# Patient Record
Sex: FEMALE | Race: WHITE
[De-identification: names, ages, dates, MRNs, and addresses within clinical notes are randomized per-mention and may not be internally consistent; named-entity substitution may affect disease eponyms.]

---

## 2018-01-02 ENCOUNTER — HOSPITAL ENCOUNTER (EMERGENCY)
Dept: HOSPITAL 91 - FTE | Age: 38
Discharge: HOME | End: 2018-01-02
Payer: COMMERCIAL

## 2018-01-02 ENCOUNTER — HOSPITAL ENCOUNTER (EMERGENCY)
Age: 38
Discharge: HOME | End: 2018-01-02

## 2018-01-02 DIAGNOSIS — J06.9: Primary | ICD-10-CM

## 2018-01-02 LAB
ADD UMIC: YES
UR ASCORBIC ACID: NEGATIVE MG/DL
UR BILIRUBIN (DIP): NEGATIVE MG/DL
UR BLOOD (DIP): (no result) MG/DL
UR CLARITY: (no result)
UR COLOR: YELLOW
UR GLUCOSE (DIP): NEGATIVE MG/DL
UR KETONES (DIP): NEGATIVE MG/DL
UR LEUKOCYTE ESTERASE (DIP): NEGATIVE LEU/UL
UR MUCUS: (no result) /HPF
UR NITRITE (DIP): NEGATIVE MG/DL
UR PH (DIP): 5 (ref 5–9)
UR RBC: 11 /HPF (ref 0–5)
UR SPECIFIC GRAVITY (DIP): 1.02 (ref 1–1.03)
UR SQUAMOUS EPITHELIAL CELL: (no result) /HPF
UR TOTAL PROTEIN (DIP): NEGATIVE MG/DL
UR UROBILINOGEN (DIP): NEGATIVE MG/DL
UR WBC: 2 /HPF (ref 0–5)

## 2018-01-02 PROCEDURE — 87400 INFLUENZA A/B EACH AG IA: CPT

## 2018-01-02 PROCEDURE — 96374 THER/PROPH/DIAG INJ IV PUSH: CPT

## 2018-01-02 PROCEDURE — 81001 URINALYSIS AUTO W/SCOPE: CPT

## 2018-01-02 PROCEDURE — 99284 EMERGENCY DEPT VISIT MOD MDM: CPT

## 2018-01-02 PROCEDURE — 71046 X-RAY EXAM CHEST 2 VIEWS: CPT

## 2018-01-02 RX ADMIN — KETOROLAC TROMETHAMINE 1 MG: 30 INJECTION, SOLUTION INTRAMUSCULAR at 10:04

## 2018-01-02 RX ADMIN — THIAMINE HYDROCHLORIDE 1 MLS/HR: 100 INJECTION, SOLUTION INTRAMUSCULAR; INTRAVENOUS at 10:04

## 2019-07-02 ENCOUNTER — HOSPITAL ENCOUNTER (EMERGENCY)
Dept: HOSPITAL 91 - FTE | Age: 39
Discharge: HOME | End: 2019-07-02
Payer: COMMERCIAL

## 2019-07-02 ENCOUNTER — HOSPITAL ENCOUNTER (EMERGENCY)
Dept: HOSPITAL 10 - FTE | Age: 39
Discharge: HOME | End: 2019-07-02
Payer: COMMERCIAL

## 2019-07-02 VITALS
HEIGHT: 66 IN | BODY MASS INDEX: 27.18 KG/M2 | WEIGHT: 169.09 LBS | WEIGHT: 169.09 LBS | HEIGHT: 66 IN | BODY MASS INDEX: 27.18 KG/M2

## 2019-07-02 VITALS — RESPIRATION RATE: 16 BRPM | DIASTOLIC BLOOD PRESSURE: 78 MMHG | HEART RATE: 106 BPM | SYSTOLIC BLOOD PRESSURE: 148 MMHG

## 2019-07-02 DIAGNOSIS — S50.861A: Primary | ICD-10-CM

## 2019-07-02 DIAGNOSIS — S90.862A: ICD-10-CM
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
## 2019-07-02 PROCEDURE — 99284 EMERGENCY DEPT VISIT MOD MDM: CPT

## 2019-07-02 PROCEDURE — 96372 THER/PROPH/DIAG INJ SC/IM: CPT

## 2019-07-02 RX ADMIN — DIPHENHYDRAMINE HYDROCHLORIDE 1 MG: 25 CAPSULE ORAL at 12:09

## 2019-07-02 RX ADMIN — IBUPROFEN 1 MG: 600 TABLET ORAL at 12:09

## 2019-07-02 RX ADMIN — DEXAMETHASONE SODIUM PHOSPHATE 1 MG: 10 INJECTION, SOLUTION INTRAMUSCULAR; INTRAVENOUS at 12:09

## 2019-07-02 NOTE — ERD
ER Documentation


Chief Complaint


Chief Complaint





Pt with possible spider bite to hand/arm . afebrile





HPI


39-year-old female presents the ED complaining of possible spider bite to her 


right arm and left foot.  She reports that she thinks she was bit this morning 


while at work.  She reports slight pain on the right arm and left foot and 


reports numbness and tingling sensation.  She reports the pain is 6 out of 10 


intensity and it is constant and localized to the affected areas.  She denies a 


history of similar bug bites in the past.  She states that she does not know w


here the bug came from or to have a bogginess.  She denies any fevers, bleeding,


or other signs of systemic infection.





ROS


All systems reviewed and are negative except as per history of present illness.





Medications


Home Meds


Active Scripts


Ibuprofen* (Motrin*) 600 Mg Tab, 600 MG PO Q6H PRN for PAIN AND OR ELEVATED 


TEMP, #30 TAB


   Prov:JORGE MELVIN PA-C         7/2/19


Diphenhydramine Hcl* (Benadryl*) 25 Mg Cap, 25 MG PO Q6 PRN for ITCHING/RASH, 


#30 TAB


   Prov:JORGE MELVIN PA-C         7/2/19


Methylprednisolone* (Medrol* DOSE PACK) 4 Mg/Dose-Pack Tab.ds.pk, 4 MG PO .AS 


DIRECTED for 5 Days, PACKET


   Prov:JORGE MELVIN PA-C         7/2/19


Guaifenesin-D-Methorphan Hb* (Guaifenesin* DM Syrup) 120 Ml Syrup, 10 ML PO Q4H 


PRN for COUGH, #240 ML


   Prov:LUL AGUILERA PA-C         1/2/18


Benzocaine/Menthol* (Cepacol* Sore Throat Lozenges) 1 Each Lozenge, 1 EACH MM 


q2h PRN for SORE THROAT, #20 LOZENGE


   Prov:LUL AGUILERA PA-C         1/2/18


Ibuprofen* (Motrin*) 600 Mg Tab, 600 MG PO Q6, #30 TAB


   Prov:LUL AGUILERA PA-C         1/2/18


Acetaminophen* (Tylenol*) 325 Mg Tablet, 1 TAB PO Q4 PRN for PAIN AND OR 


ELEVATED TEMP, #20 TAB


   Prov:LUL AGUILERA PA-C         1/2/18





Allergies


Allergies:  


Coded Allergies:  


     nitrofurantoin (Verified  Allergy, Unknown, 1/2/18)





PMhx/Soc


History of Surgery:  No


Anesthesia Reaction:  No


Hx Alcohol Use:  No


Hx Substance Use:  No


Hx Tobacco Use:  No





FmHx


Family History:  No diabetes





Physical Exam


Vitals





Vital Signs


  Date      Temp  Pulse  Resp  B/P (MAP)   Pulse Ox  O2          O2 Flow    FiO2


Time                                                 Delivery    Rate


    7/2/19  98.8    106    16      148/78        98


     11:21                          (101)





Physical Exam


Const:   No acute distress


Head:   Atraumatic 


Eyes:    Normal Conjunctiva


ENT:    Normal External Ears, Nose and Mouth.


Neck:               Full range of motion. No meningismus.


Resp:   Clear to auscultation bilaterally


Cardio:   Regular rate and rhythm, no murmurs


Abd:    Soft, non tender, non distended. Normal bowel sounds


Skin:   1 inch skin lesion to the right forearm that is pink and slightly 


elevated.  Appears like a bug bite.  Similar skin lesion on top of the left foot


Back:   No midline or flank tenderness


Ext:    No cyanosis, or edema


Neur:   Awake and alert


Psych:    Normal Mood and Affect


Results 24 hrs





Current Medications


 Medications
   Dose
          Sig/Bronwyn
       Start Time
   Status  Last


 (Trade)       Ordered        Route
 PRN     Stop Time              Admin
Dose


                              Reason                                Admin


                8 mg           ONCE  ONCE
    7/2/19        DC            7/2/19


Dexamethasone                 IM
            12:00
 7/2/19                12:09




  (Decadron)                                12:01


                25 mg          ONCE  ONCE
    7/2/19        DC            7/2/19


Diphenhydrami                 PO
            12:00
 7/2/19                12:09



ne
 HCl
                                     12:01


(Benadryl)


 Ibuprofen
     600 mg         ONCE  ONCE
    7/2/19        DC            7/2/19


(Motrin)                      PO
            12:00
 7/2/19                12:09



                                             12:01








Procedures/MDM


ED COURSE:


The patient was stable throughout ED course. I kept the patient informed of labo


ratory and diagnostic imaging results throughout the ED course.  








PROCEDURES: none











MEDICATIONS GIVEN: 


Decadron, Benadryl, Motrin


Patient tolerated medication well with no adverse reactions. Patient reported 


improvement in pain. 











MEDICAL DECISION MAKING:


Patient is a 39-year-old female presenting with bug bite on her right forearm 


and left leg since this morning.  She denies any fevers or signs of systemic 


infections.  Physical exam it appears to be a bug bite and I visualize possible 


location where the bug bit the patient.  Patient was given a shot of Decadron in


the ED and prescribed Medrol Dosepak with ibuprofen and Benadryl to help with 


her symptoms at home.  She tolerated the medicines well in the ED.  H&P and 


other data no c/w emergent process. Low suspicion for anaphylaxis, scabies, 


SJS/TEN, syphilis, erythema multiforme, sepsis, cellulitis, necrotizing 


fasciitis, gangrene, meningococcemia, allergic contact dermatitis, urticaria, 


eczema, tinea infection, or other emergent conditions. 





Vital signs were reviewed. Patient is afebrile. Patient was not hypoxic. Patient


was hemodynamically stable. Patient was told to follow up with primary care for 


further care and management. 








PRESCRIPTION: Benadryl, ibuprofen, Medrol Dosepak





DISCHARGE:


At this time, patient is stable for discharge and outpatient management. I have 


instructed the patient to follow-up with his/her primary care physician in 1-2 


days. I have discussed with the patient the possibility of needing to see a 


specialist for further workup and imaging studies if symptoms persist. I have 


instructed the patient to promptly return to the ER for any new or worsening sym


ptoms including increased pain, fever, nausea, vomiting, weakness or LOC. The 


patient expressed understanding of and agreement with this plan. All questions 


were answered. Home care instructions were provided. 





Disclaimer: Inadvertent spelling and grammatical errors are likely due to 


EHR/dictation software use and do not reflect on the overall quality of patient 


care. Also, please note that the electronic time recorded on this note does not 


necessarily reflect the actual time of the patient encounter.





Departure


Diagnosis:  


   Primary Impression:  


   Bite wound


Condition:  Fair


Patient Instructions:  Animal Bite, General


Referrals:  


[facility]


YOU HAVE RECEIVED A MEDICAL SCREENING EXAM AND THE RESULTS INDICATE THAT YOU DO 


NOT HAVE A CONDITION THAT REQUIRES URGENT TREATMENT IN THE EMERGENCY DEPARTMENT.





FURTHER EVALUATION AND TREATMENT OF YOUR CONDITION CAN WAIT UNTIL YOU ARE SEEN 


IN YOUR DOCTORS OFFICE WITHIN THE NEXT 1-2 DAYS. IT IS YOUR RESPONSIBILITY TO 


MAKE AN APPOINTMENT FOR FOLOW-UP CARE.





IF YOU HAVE A PRIMARY DOCTOR


--you should call your primary doctor and schedule an appointment





IF YOU DO NOT HAVE A PRIMARY DOCTOR YOU CAN CALL OUR PHYSICIAN REFERRAL HOTLINE 


AT


 [phone] 





IF YOU CAN NOT AFFORD TO SEE A PHYSICIAN YOU CAN CHOSE FROM THE FOLLOWING [facility] [phone] [address] [facility]





[facility] [phone] [address] [facility]. [facility]





[facility] [phone] [address] [employer] [phone] [address] [facility]. [facility] [phone] [facility]. [employer] [facility]. [phone] [address] [facility]. [facility]


YOU HAVE RECEIVED A MEDICAL SCREENING EXAM AND THE RESULTS INDICATE THAT YOU DO 


NOT HAVE A CONDITION THAT REQUIRES URGENT TREATMENT IN THE EMERGENCY DEPARTMENT.





FURTHER EVALUATION AND TREATMENT OF YOUR CONDITION CAN WAIT UNTIL YOU ARE SEEN 


IN YOUR DOCTORS OFFICE WITHIN THE NEXT 1-2 DAYS. IT IS YOUR RESPONSIBILITY TO 


MAKE AN APPOINTMENT FOR FOLOW-UP CARE.





IF YOU HAVE A PRIMARY DOCTOR


--you should call your primary doctor and schedule and appointment





IF YOU DO NOT HAVE A PRIMARY DOCTOR YOU CAN CALL OUR PHYSICIAN REFERRAL HOTLINE 


AT [phone].





IF YOU CAN NOT AFFORD TO SEE A PHYSICIAN YOU CAN CHOSE FROM THE FOLLOWING [facility]


INSTITUTIONS:





[facility]


[address] [place] [address]





[facility]


[address] [place] [address]





[facility]


[address] [place] [address]





Additional Instructions:  


Call your primary care doctor TOMORROW for an appointment during the next 1-2 


days.See the doctor sooner or return here if your condition worsens before your 


appointment time.











JORGE MELVIN PA-C            Jul 2, 2019 12:03